# Patient Record
Sex: FEMALE | Race: WHITE | URBAN - METROPOLITAN AREA
[De-identification: names, ages, dates, MRNs, and addresses within clinical notes are randomized per-mention and may not be internally consistent; named-entity substitution may affect disease eponyms.]

---

## 2017-02-07 DIAGNOSIS — F34.1 DYSTHYMIC DISORDER: Primary | ICD-10-CM

## 2017-02-07 NOTE — TELEPHONE ENCOUNTER
venlafaxine     Last Written Prescription Date: 08/12/2016  Last Fill Quantity: 90, # refills: 1  Last Office Visit with [facility] primary care provider:  08/12/2016        Last PHQ-9 score on record=   PHQ-9 SCORE 8/12/2016   Total Score -   Total Score 2

## 2017-02-09 RX ORDER — VENLAFAXINE HYDROCHLORIDE 150 MG/1
150 CAPSULE, EXTENDED RELEASE ORAL DAILY
Qty: 30 CAPSULE | Refills: 0 | Status: SHIPPED | OUTPATIENT
Start: 2017-02-09 | End: 2017-03-15

## 2017-03-15 ENCOUNTER — OFFICE VISIT (OUTPATIENT)
Dept: FAMILY MEDICINE | Facility: CLINIC | Age: 57
End: 2017-03-15
Payer: COMMERCIAL

## 2017-03-15 VITALS
RESPIRATION RATE: 18 BRPM | WEIGHT: 127 LBS | HEIGHT: 70 IN | TEMPERATURE: 97.3 F | BODY MASS INDEX: 18.18 KG/M2 | DIASTOLIC BLOOD PRESSURE: 67 MMHG | SYSTOLIC BLOOD PRESSURE: 116 MMHG | HEART RATE: 60 BPM

## 2017-03-15 DIAGNOSIS — R87.610 PAPANICOLAOU SMEAR OF CERVIX WITH ATYPICAL SQUAMOUS CELLS OF UNDETERMINED SIGNIFICANCE (ASC-US): Primary | ICD-10-CM

## 2017-03-15 DIAGNOSIS — Z92.23: ICD-10-CM

## 2017-03-15 DIAGNOSIS — F34.1 DYSTHYMIC DISORDER: ICD-10-CM

## 2017-03-15 PROCEDURE — 99214 OFFICE O/P EST MOD 30 MIN: CPT | Performed by: FAMILY MEDICINE

## 2017-03-15 RX ORDER — VENLAFAXINE HYDROCHLORIDE 150 MG/1
150 CAPSULE, EXTENDED RELEASE ORAL DAILY
Qty: 90 CAPSULE | Refills: 3 | Status: SHIPPED | OUTPATIENT
Start: 2017-03-15 | End: 2018-03-08

## 2017-03-15 ASSESSMENT — ANXIETY QUESTIONNAIRES
1. FEELING NERVOUS, ANXIOUS, OR ON EDGE: SEVERAL DAYS
GAD7 TOTAL SCORE: 4
2. NOT BEING ABLE TO STOP OR CONTROL WORRYING: SEVERAL DAYS
IF YOU CHECKED OFF ANY PROBLEMS ON THIS QUESTIONNAIRE, HOW DIFFICULT HAVE THESE PROBLEMS MADE IT FOR YOU TO DO YOUR WORK, TAKE CARE OF THINGS AT HOME, OR GET ALONG WITH OTHER PEOPLE: SOMEWHAT DIFFICULT
6. BECOMING EASILY ANNOYED OR IRRITABLE: NOT AT ALL
3. WORRYING TOO MUCH ABOUT DIFFERENT THINGS: SEVERAL DAYS
7. FEELING AFRAID AS IF SOMETHING AWFUL MIGHT HAPPEN: NOT AT ALL
5. BEING SO RESTLESS THAT IT IS HARD TO SIT STILL: NOT AT ALL

## 2017-03-15 ASSESSMENT — PATIENT HEALTH QUESTIONNAIRE - PHQ9: 5. POOR APPETITE OR OVEREATING: SEVERAL DAYS

## 2017-03-15 NOTE — PATIENT INSTRUCTIONS
Thank you for choosing [facility].  You may be receiving a survey in the mail from [facility] regarding your visit today.  Please take a few minutes to complete and return the survey to let us know how we are doing.      Our Clinic hours are:  Mondays    7:20 am - 7 pm  Tues -  Fri  7:20 am - 5 pm    Clinic Phone: [phone]    The clinic lab opens at 7:30 am Mon - Fri and appointments are required.    [facility]. [phone]  Monday-Thursday 8 am - 7pm  Tues/Wed/Fri 8 am - 5:30 pm

## 2017-03-15 NOTE — LETTER
My Depression Action Plan  Name: Bailee Britt   Date of Birth 1960  Date: 3/15/2017    My doctor: EMPERATRIZ Burnette   My clinic: [facility]  [address]  [facility] [address]  [phone]          GREEN    ZONE   Good Control    What it looks like:     Things are going generally well. You have normal up s and down s. You may even feel depressed from time to time, but bad moods usually last less than a day.   What you need to do:  1. Continue to care for yourself (see self care plan)  2. Check your depression survival kit and update it as needed  3. Follow your physician s recommendations including any medication.  4. Do not stop taking medication unless you consult with your physician first.           YELLOW         ZONE Getting Worse    What it looks like:     Depression is starting to interfere with your life.     It may be hard to get out of bed; you may be starting to isolate yourself from others.    Symptoms of depression are starting to last most all day and this has happened for several days.     You may have suicidal thoughts but they are not constant.   What you need to do:     1. Call your care team, your response to treatment will improve if you keep your care team informed of your progress. Yellow periods are signs an adjustment may need to be made.     2. Continue your self-care, even if you have to fake it!    3. Talk to someone in your support network    4. Open up your depression survival kit           RED    ZONE Medical Alert - Get Help    What it looks like:     Depression is seriously interfering with your life.     You may experience these or other symptoms: You can t get out of bed most days, can t work or engage in other necessary activities, you have trouble taking care of basic hygiene, or basic responsibilities, thoughts of suicide or death that will not go away, self-injurious behavior.     What you need to do:  1. Call your care team and  request a same-day appointment. If they are not available (weekends or after hours) call your local crisis line, emergency room or 911.      Electronically signed by: Sabrina Minor, March 15, 2017    Depression Self Care Plan / Survival Kit    Self-Care for Depression  Here s the deal. Your body and mind are really not as separate as most people think.  What you do and think affects how you feel and how you feel influences what you do and think. This means if you do things that people who feel good do, it will help you feel better.  Sometimes this is all it takes.  There is also a place for medication and therapy depending on how severe your depression is, so be sure to consult with your medical provider and/ or Behavioral Health Consultant if your symptoms are worsening or not improving.     In order to better manage my stress, I will:    Exercise  Get some form of exercise, every day. This will help reduce pain and release endorphins, the  feel good  chemicals in your brain. This is almost as good as taking antidepressants!  This is not the same as joining a gym and then never going! (they count on that by the way ) It can be as simple as just going for a walk or doing some gardening, anything that will get you moving.      Hygiene   Maintain good hygiene (Get out of bed in the morning, Make your bed, Brush your teeth, Take a shower, and Get dressed like you were going to work, even if you are unemployed).  If your clothes don't fit try to get ones that do.    Diet  I will strive to eat foods that are good for me, drink plenty of water, and avoid excessive sugar, caffeine, alcohol, and other mood-altering substances.  Some foods that are helpful in depression are: complex carbohydrates, B vitamins, flaxseed, fish or fish oil, fresh fruits and vegetables.    Psychotherapy  I agree to participate in Individual Therapy (if recommended).    Medication  If prescribed medications, I agree to take them.  Missing doses can  result in serious side effects.  I understand that drinking alcohol, or other illicit drug use, may cause potential side effects.  I will not stop my medication abruptly without first discussing it with my provider.    Staying Connected With Others  I will stay in touch with my friends, family members, and my primary care provider/team.    Use your imagination  Be creative.  We all have a creative side; it doesn t matter if it s oil painting, sand castles, or mud pies! This will also kick up the endorphins.    Witness Beauty  (AKA stop and smell the roses) Take a look outside, even in mid-winter. Notice colors, textures. Watch the squirrels and birds.     Service to others  Be of service to others.  There is always someone else in need.  By helping others we can  get out of ourselves  and remember the really important things.  This also provides opportunities for practicing all the other parts of the program.    Humor  Laugh and be silly!  Adjust your TV habits for less news and crime-drama and more comedy.    Control your stress  Try breathing deep, massage therapy, biofeedback, and meditation. Find time to relax each day.     My support system    Clinic Contact:  Phone number:    Contact 1:  Phone number:    Contact 2:  Phone number:    [place]/:  Phone number:    Therapist:  Phone number:    Local crisis center:    Phone number:    Other community support:  Phone number:

## 2017-03-15 NOTE — MR AVS SNAPSHOT
After Visit Summary   3/15/2017    Bailee Britt    MRN: [id number]           Patient Information     Date Of Birth          1960        Visit Information        Provider Department      3/15/2017 2:00 PM Humphrey Ward MD [facility]        Today's Diagnoses     Papanicolaou smear of cervix with atypical squamous cells of undetermined significance (ASC-US)    -  1    Dysthymic disorder        H/O estrogen therapy          Care Instructions          Thank you for choosing [facility].  You may be receiving a survey in the mail from [facility] regarding your visit today.  Please take a few minutes to complete and return the survey to let us know how we are doing.      Our Clinic hours are:  Mondays    7:20 am - 7 pm  Tues -  Fri  7:20 am - 5 pm    Clinic Phone: [phone]    The clinic lab opens at 7:30 am Mon - Fri and appointments are required.    [facility] [place]  Ph. [phone]  Monday-Thursday 8 am - 7pm  Tues/Wed/Fri 8 am - 5:30 pm               Follow-ups after your visit        Who to contact     If you have questions or need follow up information about today's clinic visit or your schedule please contact [facility] directly at [phone].  Normal or non-critical lab and imaging results will be communicated to you by [employer], letter or phone within 4 business days after the clinic has received the results. If you do not hear from us within 7 days, please contact the clinic through [employer] or phone. If you have a critical or abnormal lab result, we will notify you by phone as soon as possible.  Submit refill requests through [employer] or call your pharmacy and they will forward the refill request to us. Please allow 3 business days for your refill to be completed.          Additional Information About Your Visit        [employer] Information     [employer] lets you send messages to your doctor, view your test results, renew your  "prescriptions, schedule appointments and more. To sign up, go to [email or web address] . Click on \"Log in\" on the left side of the screen, which will take you to the Welcome page. Then click on \"Sign up Now\" on the right side of the page.     You will be asked to enter the access code listed below, as well as some personal information. Please follow the directions to create your username and password.     Your access code is: [email or web address]  Expires: 2017  2:28 PM     Your access code will  in 90 days. If you need help or a new code, please call your [facility] clinic or [phone].        Care EveryWhere ID     This is your Care EveryWhere ID. This could be used by other organizations to access your [facility] medical records  [id number]        Your Vitals Were     Pulse Temperature Respirations Height Last Period BMI (Body Mass Index)    60 97.3  F (36.3  C) 18 5' 9.75\" (1.772 m) 2016 (Approximate) 18.35 kg/m2       Blood Pressure from Last 3 Encounters:   03/15/17 116/67   16 120/78   16 126/82    Weight from Last 3 Encounters:   03/15/17 127 lb (57.6 kg)   16 121 lb 12.8 oz (55.2 kg)   16 123 lb (55.8 kg)              We Performed the Following     DEPRESSION ACTION PLAN (DAP)          Today's Medication Changes          These changes are accurate as of: 3/15/17  2:48 PM.  If you have any questions, ask your nurse or doctor.               These medicines have changed or have updated prescriptions.        Dose/Directions    venlafaxine 150 MG 24 hr capsule   Commonly known as:  EFFEXOR-XR   This may have changed:  additional instructions   Used for:  Dysthymic disorder   Changed by:  Humphrey Ward MD        Dose:  150 mg   Take 1 capsule (150 mg) by mouth daily   Quantity:  90 capsule   Refills:  3         Stop taking these medicines if you haven't already. Please contact your care team if you have questions.     medroxyPROGESTERone 2.5 MG tablet   Commonly known as:  " PROVERA   Stopped by:  Humphrey Ward MD                Where to get your medicines      These medications were sent to [facility] [place], [place] - [address]  [address] [address]     Phone:  [phone]     venlafaxine 150 MG 24 hr capsule                Primary Care Provider Office Phone # Fax #    R Rolly Burnette [phone]       [address] [address]        Thank you!     Thank you for choosing [facility]  for your care. Our goal is always to provide you with excellent care. Hearing back from our patients is one way we can continue to improve our services. Please take a few minutes to complete the written survey that you may receive in the mail after your visit with us. Thank you!             Your Updated Medication List - Protect others around you: Learn how to safely use, store and throw away your medicines at www.disposemymeds.org.          This list is accurate as of: 3/15/17  2:48 PM.  Always use your most recent med list.                   Brand Name Dispense Instructions for use    aspirin 81 MG tablet      Take 1 tablet by mouth daily.       atenolol 25 MG tablet    TENORMIN    90 tablet    Take 1 tablet (25 mg) by mouth daily       estradiol 2 MG tablet    ESTRACE    90 tablet    Take 1 tablet (2 mg) by mouth daily       metoprolol 25 MG 24 hr tablet    TOPROL-XL    30 tablet    Take 1 tablet (25 mg) by mouth daily       MULTIVITAMIN TABS   OR      1 TABLET DAILY       rizatriptan 10 MG tablet    MAXALT    30 tablet    Take 1 tablet (10 mg) by mouth See Admin Instructions WITH ONSET OF MIGRAINE, MAY REPEAT ONCE AFTER 2 HOURS. DO NOT EXCEED 3 TABLETS IN 24 HOURS.       venlafaxine 150 MG 24 hr capsule    EFFEXOR-XR    90 capsule    Take 1 capsule (150 mg) by mouth daily

## 2017-03-15 NOTE — PROGRESS NOTES
SUBJECTIVE:                                                    Bailee Britt is a 56 year old female who presents to clinic today for the following health issues:      Depression Followup    Status since last visit: Improved     See PHQ-9 for current symptoms.  Other associated symptoms: None    Complicating factors:   Significant life event:  No   Current substance abuse:  None  Anxiety or Panic symptoms:  No    PHQ-9  English PHQ-9   Any Language            Amount of exercise or physical activity: None    Problems taking medications regularly: No    Medication side effects: none  Diet: regular (no restrictions)        Problem list and histories reviewed & adjusted, as indicated.  Additional history: as documented        Reviewed and updated as needed this visit by clinical staff  Tobacco  Allergies  Meds       Reviewed and updated as needed this visit by Provider      Further history obtained, clarified or corrected by physician:  She is here for review of her medications. She is really not sure why she is taking estrogen and does not recall needing to take it for any specific symptoms at least not in the last several years. She has been on it for at least 6 years and more recently has been on unopposed estrogen having stopped her Provera. In the last year she has been cutting her estrogen tablets in half and has not felt any difference with that dose. She is doing well with her venlafaxine and would like that refilled.    OBJECTIVE:  LUNGS: clear to auscultation, normal breath sounds  CV: RRR without murmur  ABD: BS+, soft, nontender, no masses, no hepatosplenomegaly  EXTREMITIES: without joint tenderness, swelling or erythema.  No muscle tenderness or abnormality.  SKIN: No rashes or abnormalities  NEURO:non focal exam    ASSESSMENT:     Dysthymic disorder  Papanicolaou smear of cervix with atypical squamous cells of undetermined significance (ASC-US)  H/O estrogen therapy    PLAN:  Orders Placed This  Encounter     DEPRESSION ACTION PLAN (DAP)     venlafaxine (EFFEXOR-XR) 150 MG 24 hr capsule     I recommended she stop the estrogen given that it does not seem to be offering her any symptomatic relief and so she will start to taper down and then quit. I told her the other option would be to continue but then we need to get back on progestin which she was not in favor of.

## 2017-03-16 ASSESSMENT — ANXIETY QUESTIONNAIRES: GAD7 TOTAL SCORE: 4

## 2017-03-16 ASSESSMENT — PATIENT HEALTH QUESTIONNAIRE - PHQ9: SUM OF ALL RESPONSES TO PHQ QUESTIONS 1-9: 0

## 2017-05-23 ENCOUNTER — TELEPHONE (OUTPATIENT)
Dept: FAMILY MEDICINE | Facility: CLINIC | Age: 57
End: 2017-05-23

## 2017-05-23 DIAGNOSIS — N95.1 MENOPAUSAL SYNDROME (HOT FLASHES): ICD-10-CM

## 2017-05-23 RX ORDER — ESTRADIOL 0.5 MG/1
0.5 TABLET ORAL DAILY
Qty: 90 TABLET | Refills: 0 | Status: SHIPPED | OUTPATIENT
Start: 2017-05-23 | End: 2017-08-22

## 2017-05-23 NOTE — TELEPHONE ENCOUNTER
Reason for call:  Patient reporting a symptom    Symptom or request: Pt went off of her Estradiol med a while back and now she is having hot flashes and she wants to go back on the med and needs a new Rx - [facility].      Duration (how long have symptoms been present): ongoing    Have you been treated for this before? Yes    Additional comments:     Phone Number patient can be reached at:  Home number on file [phone] (home)    Best Time:  any    Can we leave a detailed message on this number:  YES    Call taken on 5/23/2017 at 9:47 AM by Erica Dupree

## 2017-05-23 NOTE — TELEPHONE ENCOUNTER
I am putting her back on a very low dose which should help control her hot flashes. She needs to take the Prometrium for 10 days every 6 months while on this medication to prevent hyperstimulation of the uterus and uterine cancer. Notify patient

## 2017-05-23 NOTE — TELEPHONE ENCOUNTER
Estradiol refill.  Last seen 3/15/17.  Last written 5/23/16 by Dr. Bunch.  Pt stopped taking med and now requesting refill due to hot flashes.  Advise.  Nery

## 2017-05-24 NOTE — TELEPHONE ENCOUNTER
Confirmed that both Estrogen and Progesterone orders were received @ the Pharmacy.  LM of this with pt.  Yakelin

## 2017-05-24 NOTE — TELEPHONE ENCOUNTER
aravind is calling, stating that she thought she was suppose to get an RX of Estrogen along with the Progesterone. She said the pharmacy at [facility] does not have the Estrogen. She is wondering if she is suppose to be taking that again, as she told DR. Burnette, she is having hot flashes.  Suma Linn  [facility] [place] [facility]

## 2017-06-05 DIAGNOSIS — F41.9 ANXIETY: ICD-10-CM

## 2017-06-05 NOTE — TELEPHONE ENCOUNTER
Metoprolol      Last Written Prescription Date: 11/01/16  Last Fill Quantity: 30,  # refills: 6   Last Office Visit with [facility], [facility] or [facility] prescribing provider: 11/01/16

## 2017-06-06 RX ORDER — METOPROLOL SUCCINATE 25 MG/1
TABLET, EXTENDED RELEASE ORAL
Qty: 90 TABLET | Refills: 2 | Status: SHIPPED | OUTPATIENT
Start: 2017-06-06 | End: 2018-03-08

## 2017-08-22 DIAGNOSIS — N95.1 MENOPAUSAL SYNDROME (HOT FLASHES): ICD-10-CM

## 2017-08-23 NOTE — TELEPHONE ENCOUNTER
estradiol    Last Written Prescription Date: 05/23/2017  Last Fill Quantity: 90, # refills: 0  Last Office Visit with [facility], P or [facility] prescribing provider: 3/15/2017       BP Readings from Last 3 Encounters:   03/15/17 116/67   08/12/16 120/78   04/29/16 126/82     Date of last Breast Exam: 06/18/2015

## 2017-08-31 RX ORDER — ESTRADIOL 0.5 MG/1
TABLET ORAL
Qty: 90 TABLET | Refills: 1 | Status: SHIPPED | OUTPATIENT
Start: 2017-08-31 | End: 2018-03-08

## 2017-08-31 NOTE — TELEPHONE ENCOUNTER
Prescription approved per [facility] Refill Protocol. [email or web address]

## 2017-09-14 ENCOUNTER — DOCUMENTATION ONLY (OUTPATIENT)
Dept: LAB | Facility: CLINIC | Age: 57
End: 2017-09-14

## 2017-09-14 ENCOUNTER — TELEPHONE (OUTPATIENT)
Dept: FAMILY MEDICINE | Facility: CLINIC | Age: 57
End: 2017-09-14

## 2017-09-14 DIAGNOSIS — G43.009 MIGRAINE WITHOUT AURA AND WITHOUT STATUS MIGRAINOSUS, NOT INTRACTABLE: ICD-10-CM

## 2017-09-14 DIAGNOSIS — Z13.220 ENCOUNTER FOR LIPID SCREENING FOR CARDIOVASCULAR DISEASE: ICD-10-CM

## 2017-09-14 DIAGNOSIS — F41.9 ANXIETY: ICD-10-CM

## 2017-09-14 DIAGNOSIS — F34.1 DYSTHYMIC DISORDER: Primary | ICD-10-CM

## 2017-09-14 DIAGNOSIS — Z13.6 ENCOUNTER FOR LIPID SCREENING FOR CARDIOVASCULAR DISEASE: ICD-10-CM

## 2017-09-14 NOTE — TELEPHONE ENCOUNTER
Reason for Call:  Other call back    Detailed comments: patient is calling stating she needs a TB test to be done for school, and it has to be completed by Next Tuesday 9/19. Please call her.    Phone Number Patient can be reached at: Home number on file [phone] (home)    Best Time: any    Can we leave a detailed message on this number? YES    Call taken on 9/14/2017 at 12:10 PM by Suma Linn

## 2017-09-15 ENCOUNTER — TELEPHONE (OUTPATIENT)
Dept: FAMILY MEDICINE | Facility: CLINIC | Age: 57
End: 2017-09-15

## 2017-09-15 ENCOUNTER — ALLIED HEALTH/NURSE VISIT (OUTPATIENT)
Dept: FAMILY MEDICINE | Facility: CLINIC | Age: 57
End: 2017-09-15
Payer: COMMERCIAL

## 2017-09-15 DIAGNOSIS — Z11.1 SCREENING EXAMINATION FOR PULMONARY TUBERCULOSIS: ICD-10-CM

## 2017-09-15 DIAGNOSIS — Z11.1 SCREENING EXAMINATION FOR PULMONARY TUBERCULOSIS: Primary | ICD-10-CM

## 2017-09-15 DIAGNOSIS — Z23 NEED FOR PROPHYLACTIC VACCINATION AND INOCULATION AGAINST INFLUENZA: Primary | ICD-10-CM

## 2017-09-15 DIAGNOSIS — F34.1 DYSTHYMIC DISORDER: ICD-10-CM

## 2017-09-15 DIAGNOSIS — Z13.6 ENCOUNTER FOR LIPID SCREENING FOR CARDIOVASCULAR DISEASE: ICD-10-CM

## 2017-09-15 DIAGNOSIS — Z13.220 ENCOUNTER FOR LIPID SCREENING FOR CARDIOVASCULAR DISEASE: ICD-10-CM

## 2017-09-15 DIAGNOSIS — F41.9 ANXIETY: ICD-10-CM

## 2017-09-15 LAB
BASOPHILS # BLD AUTO: 0 10E9/L (ref 0–0.2)
BASOPHILS NFR BLD AUTO: 0.5 %
DIFFERENTIAL METHOD BLD: NORMAL
EOSINOPHIL # BLD AUTO: 0.1 10E9/L (ref 0–0.7)
EOSINOPHIL NFR BLD AUTO: 1.9 %
ERYTHROCYTE [DISTWIDTH] IN BLOOD BY AUTOMATED COUNT: 11.8 % (ref 10–15)
HCT VFR BLD AUTO: 40.5 % (ref 35–47)
HGB BLD-MCNC: 13.2 G/DL (ref 11.7–15.7)
LYMPHOCYTES # BLD AUTO: 1.9 10E9/L (ref 0.8–5.3)
LYMPHOCYTES NFR BLD AUTO: 30.1 %
MCH RBC QN AUTO: 31.7 PG (ref 26.5–33)
MCHC RBC AUTO-ENTMCNC: 32.6 G/DL (ref 31.5–36.5)
MCV RBC AUTO: 97 FL (ref 78–100)
MONOCYTES # BLD AUTO: 0.5 10E9/L (ref 0–1.3)
MONOCYTES NFR BLD AUTO: 7.2 %
NEUTROPHILS # BLD AUTO: 3.9 10E9/L (ref 1.6–8.3)
NEUTROPHILS NFR BLD AUTO: 60.3 %
PLATELET # BLD AUTO: 182 10E9/L (ref 150–450)
RBC # BLD AUTO: 4.17 10E12/L (ref 3.8–5.2)
WBC # BLD AUTO: 6.4 10E9/L (ref 4–11)

## 2017-09-15 PROCEDURE — 99207 ZZC NO CHARGE NURSE ONLY: CPT

## 2017-09-15 PROCEDURE — 90471 IMMUNIZATION ADMIN: CPT

## 2017-09-15 PROCEDURE — 36415 COLL VENOUS BLD VENIPUNCTURE: CPT | Performed by: FAMILY MEDICINE

## 2017-09-15 PROCEDURE — 80048 BASIC METABOLIC PNL TOTAL CA: CPT | Performed by: FAMILY MEDICINE

## 2017-09-15 PROCEDURE — 90686 IIV4 VACC NO PRSV 0.5 ML IM: CPT

## 2017-09-15 PROCEDURE — 85025 COMPLETE CBC W/AUTO DIFF WBC: CPT | Performed by: FAMILY MEDICINE

## 2017-09-15 PROCEDURE — 84439 ASSAY OF FREE THYROXINE: CPT | Performed by: FAMILY MEDICINE

## 2017-09-15 PROCEDURE — 86480 TB TEST CELL IMMUN MEASURE: CPT | Performed by: FAMILY MEDICINE

## 2017-09-15 PROCEDURE — 80061 LIPID PANEL: CPT | Performed by: FAMILY MEDICINE

## 2017-09-15 PROCEDURE — 84443 ASSAY THYROID STIM HORMONE: CPT | Performed by: FAMILY MEDICINE

## 2017-09-15 NOTE — TELEPHONE ENCOUNTER
Patient has lab appointment TODAY and is requesting a manitoux test.  Need orders ASAP.  Thank you!

## 2017-09-15 NOTE — PROGRESS NOTES
Injectable Influenza Immunization Documentation    1.  Is the person to be vaccinated sick today? NO    2. Does the person to be vaccinated have an allergy to a component   of the vaccine? NO    3. Has the person to be vaccinated ever had a serious reaction   to influenza vaccine in the past? NON     4. Has the person to be vaccinated ever had Guillain-Barré syndrome? NO    Form completed by Soledad Rodriguez CMA

## 2017-09-15 NOTE — LETTER
[facility]  [address] [address]  [phone]       September 19, 2017       Bailee Britt    [address] [place] [address]      Dear Bailee:    acceptable results.                       Sincerely,        Rolly Burnette MD

## 2017-09-15 NOTE — MR AVS SNAPSHOT
"              After Visit Summary   9/15/2017    Bailee Britt    MRN: [id number]           Patient Information     Date Of Birth          1960        Visit Information        Provider Department      9/15/2017 2:00 PM Karla/Srinivasa Díaz [facility]        Today's Diagnoses     Need for prophylactic vaccination and inoculation against influenza    -  1       Follow-ups after your visit        Who to contact     If you have questions or need follow up information about today's clinic visit or your schedule please contact [facility] directly at [phone].  Normal or non-critical lab and imaging results will be communicated to you by [employer], letter or phone within 4 business days after the clinic has received the results. If you do not hear from us within 7 days, please contact the clinic through [employer] or phone. If you have a critical or abnormal lab result, we will notify you by phone as soon as possible.  Submit refill requests through [email or web address] or call your pharmacy and they will forward the refill request to us. Please allow 3 business days for your refill to be completed.          Additional Information About Your Visit        MyChart Information     [email or web address] lets you send messages to your doctor, view your test results, renew your prescriptions, schedule appointments and more. To sign up, go to [email or web address] . Click on \"Log in\" on the left side of the screen, which will take you to the Welcome page. Then click on \"Sign up Now\" on the right side of the page.     You will be asked to enter the access code listed below, as well as some personal information. Please follow the directions to create your username and password.     Your access code is: [email or web address]  Expires: 2017  2:00 PM     Your access code will  in 90 days. If you need help or a new code, please call your [facility] or [phone].        Care EveryWhere ID     This is your Care " EveryWhere ID. This could be used by other organizations to access your [place] medical records  [id number]        Your Vitals Were     Last Period                   02/13/2016 (Approximate)            Blood Pressure from Last 3 Encounters:   03/15/17 116/67   08/12/16 120/78   04/29/16 126/82    Weight from Last 3 Encounters:   03/15/17 127 lb (57.6 kg)   08/12/16 121 lb 12.8 oz (55.2 kg)   04/29/16 123 lb (55.8 kg)              We Performed the Following     FLU VAC, SPLIT VIRUS IM > 3 YO (QUADRIVALENT) [62767]     Vaccine Administration, Initial [61026]        Primary Care Provider Office Phone # Fax #    R Rolly Burnette [phone] [address] [address]        Equal Access to Services     YARA COLE : Hadii aad amanda hadasho Soomaali, waaxda luqadaha, qaybta kaalmada adetrinhyada, valdez collins . So [facility] [phone].    ATENCIÓN: Si habla español, tiene a christiansen disposición servicios gratuitos de asistencia lingüística. Edwardo al [phone].    We comply with applicable federal civil rights laws and [place] laws. We do not discriminate on the basis of race, color, national origin, age, disability sex, sexual orientation or gender identity.            Thank you!     Thank you for choosing [facility]note 2[facility]  for your care. Our goal is always to provide you with excellent care. Hearing back from our patients is one way we can continue to improve our services. Please take a few minutes to complete the written survey that you may receive in the mail after your visit with us. Thank you!             Your Updated Medication List - Protect others around you: Learn how to safely use, store and throw away your medicines at www.disposemymeds.org.          This list is accurate as of: 9/15/17  2:00 PM.  Always use your most recent med list.                   Brand Name Dispense Instructions for use Diagnosis    aspirin 81 MG tablet      Take 1  tablet by mouth daily.        atenolol 25 MG tablet    TENORMIN    90 tablet    Take 1 tablet (25 mg) by mouth daily    Migraine without aura and without status migrainosus, not intractable       estradiol 0.5 MG tablet    ESTRACE    90 tablet    TAKE ONE TABLET BY MOUTH ONCE DAILY    Menopausal syndrome (hot flashes)       metoprolol 25 MG 24 hr tablet    TOPROL-XL    90 tablet    TAKE ONE TABLET BY MOUTH ONCE DAILY    Anxiety       MULTIVITAMIN TABS   OR      1 TABLET DAILY        rizatriptan 10 MG tablet    MAXALT    30 tablet    Take 1 tablet (10 mg) by mouth See Admin Instructions WITH ONSET OF MIGRAINE, MAY REPEAT ONCE AFTER 2 HOURS. DO NOT EXCEED 3 TABLETS IN 24 HOURS.    Migraine without aura and without status migrainosus, not intractable       venlafaxine 150 MG 24 hr capsule    EFFEXOR-XR    90 capsule    Take 1 capsule (150 mg) by mouth daily    Dysthymic disorder

## 2017-09-16 LAB
ANION GAP SERPL CALCULATED.3IONS-SCNC: 5 MMOL/L (ref 3–14)
BUN SERPL-MCNC: 12 MG/DL (ref 7–30)
CALCIUM SERPL-MCNC: 9.1 MG/DL (ref 8.5–10.1)
CHLORIDE SERPL-SCNC: 101 MMOL/L (ref 94–109)
CHOLEST SERPL-MCNC: 202 MG/DL
CO2 SERPL-SCNC: 30 MMOL/L (ref 20–32)
CREAT SERPL-MCNC: 0.71 MG/DL (ref 0.52–1.04)
GFR SERPL CREATININE-BSD FRML MDRD: 85 ML/MIN/1.7M2
GLUCOSE SERPL-MCNC: 92 MG/DL (ref 70–99)
HDLC SERPL-MCNC: 82 MG/DL
LDLC SERPL CALC-MCNC: 97 MG/DL
NONHDLC SERPL-MCNC: 120 MG/DL
POTASSIUM SERPL-SCNC: 3.6 MMOL/L (ref 3.4–5.3)
SODIUM SERPL-SCNC: 136 MMOL/L (ref 133–144)
T4 FREE SERPL-MCNC: 0.88 NG/DL (ref 0.76–1.46)
TRIGL SERPL-MCNC: 113 MG/DL
TSH SERPL DL<=0.005 MIU/L-ACNC: 0.36 MU/L (ref 0.4–4)

## 2017-09-19 LAB
M TB TUBERC IFN-G BLD QL: NEGATIVE
M TB TUBERC IFN-G/MITOGEN IGNF BLD: 0 IU/ML

## 2017-10-31 DIAGNOSIS — G43.009 MIGRAINE WITHOUT AURA AND WITHOUT STATUS MIGRAINOSUS, NOT INTRACTABLE: ICD-10-CM

## 2017-10-31 RX ORDER — RIZATRIPTAN BENZOATE 10 MG/1
10 TABLET ORAL SEE ADMIN INSTRUCTIONS
Qty: 30 TABLET | Refills: 1 | Status: SHIPPED | OUTPATIENT
Start: 2017-10-31 | End: 2018-03-08

## 2017-10-31 NOTE — TELEPHONE ENCOUNTER
Rizatriptan      Last Written Prescription Date: 0/12/2016  Last Fill Quantity: 30, # refills: 11  Last Office Visit with [facility], [facility] or [facility] prescribing provider: 03/15/2017       BP Readings from Last 3 Encounters:   03/15/17 116/67   08/12/16 120/78   04/29/16 126/82     Domenico VALLADARES (R)

## 2017-12-28 ENCOUNTER — TELEPHONE (OUTPATIENT)
Dept: FAMILY MEDICINE | Facility: CLINIC | Age: 57
End: 2017-12-28

## 2017-12-28 NOTE — TELEPHONE ENCOUNTER
Panel Management Review      Patient has the following on her problem list: None      Composite cancer screening  Chart review shows that this patient is due/due soon for the following Mammogram  Summary:    Patient is due/failing the following:   MAMMOGRAM and PHQ9    Action needed:   Patient needs to do PHQ9. and Patient needs referral/order: mammogram    Type of outreach:    Phone, left message for patient to call back.     Questions for provider review:    None                                                                                                                                    Mariangel Gautam MA       Chart routed to Care Team .

## 2018-03-08 ENCOUNTER — OFFICE VISIT (OUTPATIENT)
Dept: FAMILY MEDICINE | Facility: CLINIC | Age: 58
End: 2018-03-08
Payer: MEDICAID

## 2018-03-08 VITALS
BODY MASS INDEX: 18.5 KG/M2 | RESPIRATION RATE: 12 BRPM | HEART RATE: 67 BPM | HEIGHT: 70 IN | OXYGEN SATURATION: 99 % | DIASTOLIC BLOOD PRESSURE: 79 MMHG | WEIGHT: 129.2 LBS | TEMPERATURE: 98.2 F | SYSTOLIC BLOOD PRESSURE: 119 MMHG

## 2018-03-08 DIAGNOSIS — F41.9 ANXIETY: ICD-10-CM

## 2018-03-08 DIAGNOSIS — Z12.31 ENCOUNTER FOR SCREENING MAMMOGRAM FOR BREAST CANCER: Primary | ICD-10-CM

## 2018-03-08 DIAGNOSIS — Z80.3 FAMILY HISTORY OF MALIGNANT NEOPLASM OF BREAST IN FIRST DEGREE RELATIVE: ICD-10-CM

## 2018-03-08 DIAGNOSIS — G43.009 MIGRAINE WITHOUT AURA AND WITHOUT STATUS MIGRAINOSUS, NOT INTRACTABLE: ICD-10-CM

## 2018-03-08 DIAGNOSIS — F34.1 DYSTHYMIC DISORDER: ICD-10-CM

## 2018-03-08 PROCEDURE — 99214 OFFICE O/P EST MOD 30 MIN: CPT | Performed by: NURSE PRACTITIONER

## 2018-03-08 RX ORDER — VENLAFAXINE HYDROCHLORIDE 150 MG/1
150 CAPSULE, EXTENDED RELEASE ORAL DAILY
Qty: 90 CAPSULE | Refills: 3 | Status: SHIPPED | OUTPATIENT
Start: 2018-03-08 | End: 2019-04-11

## 2018-03-08 RX ORDER — RIZATRIPTAN BENZOATE 10 MG/1
10 TABLET ORAL SEE ADMIN INSTRUCTIONS
Qty: 30 TABLET | Refills: 1 | Status: SHIPPED | OUTPATIENT
Start: 2018-03-08 | End: 2019-03-25

## 2018-03-08 RX ORDER — METOPROLOL SUCCINATE 25 MG/1
25 TABLET, EXTENDED RELEASE ORAL DAILY
Qty: 90 TABLET | Refills: 3 | Status: SHIPPED | OUTPATIENT
Start: 2018-03-08 | End: 2019-04-11

## 2018-03-08 ASSESSMENT — ANXIETY QUESTIONNAIRES
2. NOT BEING ABLE TO STOP OR CONTROL WORRYING: NOT AT ALL
6. BECOMING EASILY ANNOYED OR IRRITABLE: NOT AT ALL
5. BEING SO RESTLESS THAT IT IS HARD TO SIT STILL: NOT AT ALL
3. WORRYING TOO MUCH ABOUT DIFFERENT THINGS: NOT AT ALL
1. FEELING NERVOUS, ANXIOUS, OR ON EDGE: NOT AT ALL
GAD7 TOTAL SCORE: 0
7. FEELING AFRAID AS IF SOMETHING AWFUL MIGHT HAPPEN: NOT AT ALL
IF YOU CHECKED OFF ANY PROBLEMS ON THIS QUESTIONNAIRE, HOW DIFFICULT HAVE THESE PROBLEMS MADE IT FOR YOU TO DO YOUR WORK, TAKE CARE OF THINGS AT HOME, OR GET ALONG WITH OTHER PEOPLE: NOT DIFFICULT AT ALL

## 2018-03-08 ASSESSMENT — PATIENT HEALTH QUESTIONNAIRE - PHQ9: 5. POOR APPETITE OR OVEREATING: NOT AT ALL

## 2018-03-08 NOTE — NURSING NOTE
"Chief Complaint   Patient presents with     Hypertension     refill metropolol     Headache     refill maxalt      Depression     refill effexor       Initial /79 (BP Location: Right arm, Patient Position: Sitting, Cuff Size: Adult Regular)  Pulse 67  Temp 98.2  F (36.8  C) (Tympanic)  Resp 12  Ht 5' 9.75\" (1.772 m)  Wt 129 lb 3.2 oz (58.6 kg)  LMP 02/13/2016 (Approximate)  SpO2 99%  BMI 18.67 kg/m2 Estimated body mass index is 18.67 kg/(m^2) as calculated from the following:    Height as of this encounter: 5' 9.75\" (1.772 m).    Weight as of this encounter: 129 lb 3.2 oz (58.6 kg).  Medication Reconciliation: complete  "

## 2018-03-08 NOTE — LETTER
My Migraine Action Plan      Date: 3/8/2018     My Name: Bailee Britt   YOB: 1960  My Pharmacy:    [id number] - [address] [facility] - [place], [place] - [address] [address]       My (Preventative) Control Medicine: n.a        My Rescue Medicine: Maxalt   My Doctor: EMPERATRIZ Burnette     My Clinic: [address] [address]  [phone]        GREEN ZONE = Good Control    My headache plan is working.   I can do what I need to do.           I WILL:     ? Keep managing my triggers.  ? Write in my migraine diary each time I have a headache.  ? Keep taking my preventive (controller) medicine daily.  ? Take my relief and rescue medicine as needed.             YELLOW ZONE = Not Enough Control    My headache plan isn t always working.   My headaches keep me from doing   some of the things I need to do.       I WILL:     ? Set goals to control my triggers and act on them.  ? Write in my migraine diary each time I have a headache and review it for                      patterns or new triggers.  ? Keep taking my preventive (controller) medicine daily.  ? Take my relief and rescue medicine as needed.  ? Call my doctor or clinic at if I stay in the Yellow Zone.             RED ZONE = Poor or No Control    My headache plan has  failed. I can t do anything  when I have one. My  medicines aren t working.           I WILL:   ? Set goals to control my triggers and act on them.  ? Write in my migraine diary each time I have a headache and review it for                      patterns or new triggers.  ? Keep taking my preventive (controller) medicine daily.  ? Take my relief and rescue medicine as needed.  ? Call my doctor or clinic or go to urgent care or an ER if I m having the worst                  headache of my life.  ? Call my doctor or clinic or go to urgent care or an ER if my medicine doesn t work.  ? Let  my doctor or clinic know within 2 weeks if I have gone to an urgent care or             emergency department.          Provider specific instructions:  n/a

## 2018-03-08 NOTE — PATIENT INSTRUCTIONS
[facility]- [phone]  2nd/4th Monday afternoon, every other Wednesday afternoon  Understanding Anxiety Disorders  Almost everyone gets nervous now and then. It s normal to have knots in your stomach before a test, or for your heart to race on a first date. But an anxiety disorder is much more than a case of nerves. In fact, its symptoms may be overwhelming. But treatment can relieve many of these symptoms. Talking to your healthcare provider is the first step.    What are anxiety disorders?  An anxiety disorder causes intense feelings of panic and fear. These feelings may arise for no apparent reason. And they tend to recur again and again. They may prevent you from coping with life and cause you great distress. As a result, you may avoid anything that triggers your fear. In extreme cases, you may never leave the house. Anxiety disorders may cause other symptoms, such as:    Obsessive thoughts you can t control    Constant nightmares or painful thoughts of the past    Nausea, sweating, and muscle tension    Trouble sleeping or concentrating  What causes anxiety disorders?  Anxiety disorders tend to run in families. For some people, childhood abuse or neglect may play a role. For others, stressful life events or trauma may trigger anxiety disorders. Anxiety can trigger low self-esteem and poor coping skills.  Common anxiety disorders    Panic disorder. This causes an intense fear of being in danger.    Phobias. These are extreme fears of certain objects, places, or events.    Obsessive-compulsive disorder. This causes you to have unwanted thoughts and urges. You also may perform certain actions over and over.    Posttraumatic stress disorder. This occurs in people who have survived a terrible ordeal. It can cause nightmares and flashbacks about the event.    Generalized anxiety disorder. This causes constant worry that can greatly disrupt your life.   Getting better  You may believe that nothing can help you. Or,  you might fear what others may think. But most anxiety symptoms can be eased. Having an anxiety disorder is nothing to be ashamed of. Most people do best with treatment that combines medicine and therapy. These aren t cures. But they can help you live a healthier life.  Date Last Reviewed: 2/1/2017 2000-2017 [employer]. [address] [place] [address], [place] [address]. All rights reserved. This information is not intended as a substitute for professional medical care. Always follow your healthcare professional's instructions.        Preventing Migraine Headaches: Triggers     Red wine is a common migraine trigger.     The first step in preventing migraines is to learn what triggers them. You may then be able to control your triggers to avoid or reduce the severity of your migraines.  Know your triggers  Be aware that you may have more than one trigger, and that some triggers may work together. Common migraine triggers include:    Food and nutrition. Skipping meals or not drinking enough water can trigger headaches. So can certain foods, such as caffeine, monosodium glutamate (MSG), aged cheese, or sausage.    Alcohol. Red wine and other alcoholic beverages are common migraine triggers.    Chemicals. Scents, cleaning products, gasoline, glue, perfume, and paint can be triggers. So can tobacco smoke, including secondhand smoke.    Emotions. Stress can trigger headaches or make them worse once they begin.    Sleep disruption. Staying up late, sleeping late, and traveling across time zones can disrupt your sleep cycle, triggering headaches.    Hormones. Many women notice that migraines tend to happen at a certain point in their menstrual cycle. Birth control pills or hormone replacement therapy may also trigger migraines.    Environment and weather. Air travel, changes in altitude, air pressure changes, hot sun, or bright or flashing lights can be triggers.  Control your triggers  These are some of the things  you can do to try to control triggers:    Avoid triggers if you can. For example, stay clear of alcohol and foods that trigger your headaches. Use unscented household products. Keep regular sleep habits. Manage stress to help control emotional triggers.    Change your behavior at times when triggers can't be avoided. For example, make sure to get enough rest and drink plenty of water while you're traveling. Make sure to carry a hat, sunglasses, and your medicines. Be alert for migraine symptoms, so you can treat a migraine early if it happens.  Date Last Reviewed: 10/9/2015    5491-8711 [employer]. [address] [place] [address], [place] [address]. All rights reserved. This information is not intended as a substitute for professional medical care. Always follow your healthcare professional's instructions.

## 2018-03-08 NOTE — MR AVS SNAPSHOT
After Visit Summary   3/8/2018    Bailee Britt    MRN: [id number]           Patient Information     Date Of Birth          1960        Visit Information        Provider Department      3/8/2018 10:40 AM Sabrina Gomez APRN CNP [facility]        Today's Diagnoses     Encounter for screening mammogram for breast cancer    -  1    Anxiety        Dysthymic disorder        Migraine without aura and without status migrainosus, not intractable          Care Instructions    [facility]- [phone]  2nd/4th Monday afternoon, every other Wednesday afternoon  Understanding Anxiety Disorders  Almost everyone gets nervous now and then. It s normal to have knots in your stomach before a test, or for your heart to race on a first date. But an anxiety disorder is much more than a case of nerves. In fact, its symptoms may be overwhelming. But treatment can relieve many of these symptoms. Talking to your healthcare provider is the first step.    What are anxiety disorders?  An anxiety disorder causes intense feelings of panic and fear. These feelings may arise for no apparent reason. And they tend to recur again and again. They may prevent you from coping with life and cause you great distress. As a result, you may avoid anything that triggers your fear. In extreme cases, you may never leave the house. Anxiety disorders may cause other symptoms, such as:    Obsessive thoughts you can t control    Constant nightmares or painful thoughts of the past    Nausea, sweating, and muscle tension    Trouble sleeping or concentrating  What causes anxiety disorders?  Anxiety disorders tend to run in families. For some people, childhood abuse or neglect may play a role. For others, stressful life events or trauma may trigger anxiety disorders. Anxiety can trigger low self-esteem and poor coping skills.  Common anxiety disorders    Panic disorder. This causes an intense fear of being in  danger.    Phobias. These are extreme fears of certain objects, places, or events.    Obsessive-compulsive disorder. This causes you to have unwanted thoughts and urges. You also may perform certain actions over and over.    Posttraumatic stress disorder. This occurs in people who have survived a terrible ordeal. It can cause nightmares and flashbacks about the event.    Generalized anxiety disorder. This causes constant worry that can greatly disrupt your life.   Getting better  You may believe that nothing can help you. Or, you might fear what others may think. But most anxiety symptoms can be eased. Having an anxiety disorder is nothing to be ashamed of. Most people do best with treatment that combines medicine and therapy. These aren t cures. But they can help you live a healthier life.  Date Last Reviewed: 2/1/2017 2000-2017 [employer]. [address] [place] [address]. All rights reserved. This information is not intended as a substitute for professional medical care. Always follow your healthcare professional's instructions.        Preventing Migraine Headaches: Triggers     Red wine is a common migraine trigger.     The first step in preventing migraines is to learn what triggers them. You may then be able to control your triggers to avoid or reduce the severity of your migraines.  Know your triggers  Be aware that you may have more than one trigger, and that some triggers may work together. Common migraine triggers include:    Food and nutrition. Skipping meals or not drinking enough water can trigger headaches. So can certain foods, such as caffeine, monosodium glutamate (MSG), aged cheese, or sausage.    Alcohol. Red wine and other alcoholic beverages are common migraine triggers.    Chemicals. Scents, cleaning products, gasoline, glue, perfume, and paint can be triggers. So can tobacco smoke, including secondhand smoke.    Emotions. Stress can trigger headaches or make them  worse once they begin.    Sleep disruption. Staying up late, sleeping late, and traveling across time zones can disrupt your sleep cycle, triggering headaches.    Hormones. Many women notice that migraines tend to happen at a certain point in their menstrual cycle. Birth control pills or hormone replacement therapy may also trigger migraines.    Environment and weather. Air travel, changes in altitude, air pressure changes, hot sun, or bright or flashing lights can be triggers.  Control your triggers  These are some of the things you can do to try to control triggers:    Avoid triggers if you can. For example, stay clear of alcohol and foods that trigger your headaches. Use unscented household products. Keep regular sleep habits. Manage stress to help control emotional triggers.    Change your behavior at times when triggers can't be avoided. For example, make sure to get enough rest and drink plenty of water while you're traveling. Make sure to carry a hat, sunglasses, and your medicines. Be alert for migraine symptoms, so you can treat a migraine early if it happens.  Date Last Reviewed: 10/9/2015    6647-8294 [employer]. [address] [place] [address]. All rights reserved. This information is not intended as a substitute for professional medical care. Always follow your healthcare professional's instructions.                Follow-ups after your visit        Future tests that were ordered for you today     Open Future Orders        Priority Expected Expires Ordered    *MA Screening Digital Bilateral Routine  3/8/2019 3/8/2018            Who to contact     If you have questions or need follow up information about today's clinic visit or your schedule please contact [facility] directly at [phone].  Normal or non-critical lab and imaging results will be communicated to you by MyChart, letter or phone within 4 business days after the clinic has received the results.  "If you do not hear from us within 7 days, please contact the clinic through [employer] or phone. If you have a critical or abnormal lab result, we will notify you by phone as soon as possible.  Submit refill requests through [employer] or call your pharmacy and they will forward the refill request to us. Please allow 3 business days for your refill to be completed.          Additional Information About Your Visit        [employer] Information     [employer] lets you send messages to your doctor, view your test results, renew your prescriptions, schedule appointments and more. To sign up, go to [email or web address] . Click on \"Log in\" on the left side of the screen, which will take you to the Welcome page. Then click on \"Sign up Now\" on the right side of the page.     You will be asked to enter the access code listed below, as well as some personal information. Please follow the directions to create your username and password.     Your access code is: [email or web address]  Expires: 2018 11:11 AM     Your access code will  in 90 days. If you need help or a new code, please call your [facility] clinic or [phone].        Care EveryWhere ID     This is your Care EveryWhere ID. This could be used by other organizations to access your [facility] medical records  [id number]        Your Vitals Were     Pulse Temperature Respirations Height Last Period Pulse Oximetry    67 98.2  F (36.8  C) (Tympanic) 12 5' 9.75\" (1.772 m) 2016 (Approximate) 99%    BMI (Body Mass Index)                   18.67 kg/m2            Blood Pressure from Last 3 Encounters:   18 119/79   03/15/17 116/67   16 120/78    Weight from Last 3 Encounters:   18 129 lb 3.2 oz (58.6 kg)   03/15/17 127 lb (57.6 kg)   16 121 lb 12.8 oz (55.2 kg)                 Today's Medication Changes          These changes are accurate as of 3/8/18 11:11 AM.  If you have any questions, ask your nurse or doctor.               These medicines have " changed or have updated prescriptions.        Dose/Directions    metoprolol succinate 25 MG 24 hr tablet   Commonly known as:  TOPROL-XL   This may have changed:  See the new instructions.   Used for:  Anxiety   Changed by:  Sabrina Gomez APRN CNP        Dose:  25 mg   Take 1 tablet (25 mg) by mouth daily   Quantity:  90 tablet   Refills:  3         Stop taking these medicines if you haven't already. Please contact your care team if you have questions.     atenolol 25 MG tablet   Commonly known as:  TENORMIN   Stopped by:  Sabrina Gomez APRN CNP                Where to get your medicines      These medications were sent to [facility] [address] [place] [address] [facility]  [address] [place] [address]     Phone:  [phone]     metoprolol succinate 25 MG 24 hr tablet    venlafaxine 150 MG 24 hr capsule         Some of these will need a paper prescription and others can be bought over the counter.  Ask your nurse if you have questions.     Bring a paper prescription for each of these medications     rizatriptan 10 MG tablet                Primary Care Provider Office Phone # Fax #    R Rolly Burnette [phone] [address] [facility] [address]        Equal Access to Services     YARA COLE AH: Hadii melyssa patel hadasho Soomaali, waaxda luqadaha, qaybta kaalmada adeegyada, valdez poole. So [facility] [phone].    ATENCIÓN: Si habla español, tiene a christiansen disposición servicios gratuitos de asistencia lingüística. Llame al [phone].    We comply with applicable federal civil rights laws and [place] laws. We do not discriminate on the basis of race, color, national origin, age, disability, sex, sexual orientation, or gender identity.            Thank you!     Thank you for choosing [facility]  for your care. Our goal is always to provide you with excellent care. Hearing back from our patients is one  way we can continue to improve our services. Please take a few minutes to complete the written survey that you may receive in the mail after your visit with us. Thank you!             Your Updated Medication List - Protect others around you: Learn how to safely use, store and throw away your medicines at www.disposemymeds.org.          This list is accurate as of 3/8/18 11:11 AM.  Always use your most recent med list.                   Brand Name Dispense Instructions for use Diagnosis    aspirin 81 MG tablet      Take 1 tablet by mouth daily.        metoprolol succinate 25 MG 24 hr tablet    TOPROL-XL    90 tablet    Take 1 tablet (25 mg) by mouth daily    Anxiety       MULTIVITAMIN TABS   OR      1 TABLET DAILY        rizatriptan 10 MG tablet    MAXALT    30 tablet    Take 1 tablet (10 mg) by mouth See Admin Instructions WITH ONSET OF MIGRAINE, MAY REPEAT ONCE AFTER 2 HOURS. DO NOT EXCEED 3 TABLETS IN 24 HOURS.    Migraine without aura and without status migrainosus, not intractable       venlafaxine 150 MG 24 hr capsule    EFFEXOR-XR    90 capsule    Take 1 capsule (150 mg) by mouth daily    Dysthymic disorder

## 2018-03-08 NOTE — PROGRESS NOTES
SUBJECTIVE:   Bailee Britt is a 57 year old female who presents to clinic today for the following health issues:          Medication Followup of metropolol    Taking Medication as prescribed: yes    Side Effects:  None    Medication Helping Symptoms:  Yes- uses for tachycardia associated with anxiety      Depression and Anxiety Follow-Up    Status since last visit: Stable to improved-no issue    Other associated symptoms:None    Complicating factors:     Significant life event: No     Current substance abuse: None    PHQ-9 8/12/2016 3/15/2017 3/8/2018   Total Score 2 0 0   Q9: Suicide Ideation Not at all Not at all Not at all     DORY-7 SCORE 8/12/2016 3/15/2017 3/8/2018   Total Score - - -   Total Score 11 4 0     In the past two weeks have you had thoughts of suicide or self-harm?  No.    Do you have concerns about your personal safety or the safety of others?   No  PHQ-9  English  PHQ-9   Any Language  DORY-7  Suicide Assessment Five-step Evaluation and Treatment (SAFE-T)      Amount of exercise or physical activity: 6-7 days/week for an average of 15-30 minutes    Problems taking medications regularly: No    Medication side effects: none     Diet: regular (no restrictions) besides no added salt    Medication Followup of venlafaxine    Taking Medication as prescribed: yes    Side Effects:  None    Medication Helping Symptoms:  yes     Medication Followup of Maxalt    Taking Medication as prescribed: yes    Side Effects:  None    Medication Helping Symptoms:  Yes-hasn't had any recent migraines-she believes that they are related to weather changes and would like to have some on hand with spring coming.        Migraine Follow-Up    Headaches symptoms:  Stable - seasonal, spring and fall.    Frequency: twice a year     Duration of headaches: 1 day    Able to do normal daily activities/work with migraines: No - not unless she takes the Maxalt    Rescue/Relief medication:Maxalt              Effectiveness: total  "relief    Preventative medication: None    Neurologic complications: No new stroke-like symptoms, loss of vision or speech, numbness or weakness    In the past 4 weeks, how often have you gone to Urgent Care or the emergency room because of your headaches?  0      Problem list and histories reviewed & adjusted, as indicated.  Additional history: as documented    Patient Active Problem List   Diagnosis     Dysthymic disorder     CARDIOVASCULAR SCREENING; LDL GOAL LESS THAN 160     Advanced directives, counseling/discussion     ASCUS on Pap smear     Migraine without aura and without status migrainosus, not intractable     Anxiety     Past Surgical History:   Procedure Laterality Date     OPEN REDUCTION INTERNAL FIXATION TOE(S)  10/21/2011    Procedure:OPEN REDUCTION INTERNAL FIXATION TOE(S); Open Reduction Internal Fixation 5th Metatarsal Left Foot; Surgeon:PAGE DALAL; Location:[facility]     SURGICAL HISTORY OF -   1992    CYST RIGHT NECK LOCAL     TONSILLECTOMY         Social History   Substance Use Topics     Smoking status: Never Smoker     Smokeless tobacco: Never Used     Alcohol use Yes      Comment: occas     Family History   Problem Relation Age of Onset     Hypertension Mother      CANCER Mother      Hypertension Father      CANCER Brother      HEART DISEASE Brother      Hypertension Brother      Family History Negative No family hx of            Reviewed and updated as needed this visit by clinical staff  Tobacco  Allergies  Meds  Med Hx  Surg Hx  Fam Hx  Soc Hx      Reviewed and updated as needed this visit by Provider         ROS:  Constitutional, HEENT, cardiovascular, pulmonary, gi and gu systems are negative, except as otherwise noted.    OBJECTIVE:     /79 (BP Location: Right arm, Patient Position: Sitting, Cuff Size: Adult Regular)  Pulse 67  Temp 98.2  F (36.8  C) (Tympanic)  Resp 12  Ht 5' 9.75\" (1.772 m)  Wt 129 lb 3.2 oz (58.6 kg)  LMP 02/13/2016 (Approximate)  SpO2 99%  " BMI 18.67 kg/m2  Body mass index is 18.67 kg/(m^2).  GENERAL: healthy, alert and no distress  NECK: no adenopathy, no asymmetry, masses, or scars and thyroid normal to palpation  RESP: lungs clear to auscultation - no rales, rhonchi or wheezes  CV: regular rates and rhythm, normal S1 S2, no S3 or S4 and no murmur, click or rub  SKIN: no suspicious lesions or rashes  NEURO: Normal strength and tone, mentation intact and speech normal  PSYCH: mentation appears normal, affect normal/bright    Diagnostic Test Results:  none     ASSESSMENT/PLAN:     Migraine: controlled   Plan:  No changes in the patient's current treatment plan  See patient instructions for Migraine Action Plan      ICD-10-CM    1. Encounter for screening mammogram for breast cancer Z12.31 *MA Screening Digital Bilateral   2. Anxiety F41.9 metoprolol succinate (TOPROL-XL) 25 MG 24 hr tablet   3. Dysthymic disorder F34.1 venlafaxine (EFFEXOR-XR) 150 MG 24 hr capsule    pt going thru a divorce currently.  family and Muslim have been supportive.   4. Migraine without aura and without status migrainosus, not intractable G43.009 rizatriptan (MAXALT) 10 MG tablet       FURTHER TESTING:       - mammogram    FUTURE APPOINTMENTS:       - Follow-up for annual visit or as needed    Patient Instructions     [facility]- [phone]  2nd/4th Monday afternoon, every other Wednesday afternoon  Understanding Anxiety Disorders  Almost everyone gets nervous now and then. It s normal to have knots in your stomach before a test, or for your heart to race on a first date. But an anxiety disorder is much more than a case of nerves. In fact, its symptoms may be overwhelming. But treatment can relieve many of these symptoms. Talking to your healthcare provider is the first step.    What are anxiety disorders?  An anxiety disorder causes intense feelings of panic and fear. These feelings may arise for no apparent reason. And they tend to recur again and again. They may prevent you  from coping with life and cause you great distress. As a result, you may avoid anything that triggers your fear. In extreme cases, you may never leave the house. Anxiety disorders may cause other symptoms, such as:    Obsessive thoughts you can t control    Constant nightmares or painful thoughts of the past    Nausea, sweating, and muscle tension    Trouble sleeping or concentrating  What causes anxiety disorders?  Anxiety disorders tend to run in families. For some people, childhood abuse or neglect may play a role. For others, stressful life events or trauma may trigger anxiety disorders. Anxiety can trigger low self-esteem and poor coping skills.  Common anxiety disorders    Panic disorder. This causes an intense fear of being in danger.    Phobias. These are extreme fears of certain objects, places, or events.    Obsessive-compulsive disorder. This causes you to have unwanted thoughts and urges. You also may perform certain actions over and over.    Posttraumatic stress disorder. This occurs in people who have survived a terrible ordeal. It can cause nightmares and flashbacks about the event.    Generalized anxiety disorder. This causes constant worry that can greatly disrupt your life.   Getting better  You may believe that nothing can help you. Or, you might fear what others may think. But most anxiety symptoms can be eased. Having an anxiety disorder is nothing to be ashamed of. Most people do best with treatment that combines medicine and therapy. These aren t cures. But they can help you live a healthier life.  Date Last Reviewed: 2/1/2017 2000-2017 [employer]. [address] [place] [address], [place] [address]. All rights reserved. This information is not intended as a substitute for professional medical care. Always follow your healthcare professional's instructions.        Preventing Migraine Headaches: Triggers     Red wine is a common migraine trigger.     The first step in preventing  migraines is to learn what triggers them. You may then be able to control your triggers to avoid or reduce the severity of your migraines.  Know your triggers  Be aware that you may have more than one trigger, and that some triggers may work together. Common migraine triggers include:    Food and nutrition. Skipping meals or not drinking enough water can trigger headaches. So can certain foods, such as caffeine, monosodium glutamate (MSG), aged cheese, or sausage.    Alcohol. Red wine and other alcoholic beverages are common migraine triggers.    Chemicals. Scents, cleaning products, gasoline, glue, perfume, and paint can be triggers. So can tobacco smoke, including secondhand smoke.    Emotions. Stress can trigger headaches or make them worse once they begin.    Sleep disruption. Staying up late, sleeping late, and traveling across time zones can disrupt your sleep cycle, triggering headaches.    Hormones. Many women notice that migraines tend to happen at a certain point in their menstrual cycle. Birth control pills or hormone replacement therapy may also trigger migraines.    Environment and weather. Air travel, changes in altitude, air pressure changes, hot sun, or bright or flashing lights can be triggers.  Control your triggers  These are some of the things you can do to try to control triggers:    Avoid triggers if you can. For example, stay clear of alcohol and foods that trigger your headaches. Use unscented household products. Keep regular sleep habits. Manage stress to help control emotional triggers.    Change your behavior at times when triggers can't be avoided. For example, make sure to get enough rest and drink plenty of water while you're traveling. Make sure to carry a hat, sunglasses, and your medicines. Be alert for migraine symptoms, so you can treat a migraine early if it happens.  Date Last Reviewed: 10/9/2015    9788-2708 [employer]. [address] [place] [address], [place] [address].  All rights reserved. This information is not intended as a substitute for professional medical care. Always follow your healthcare professional's instructions.            GIN Diggs [facility]

## 2018-03-09 ASSESSMENT — ANXIETY QUESTIONNAIRES: GAD7 TOTAL SCORE: 0

## 2018-03-09 ASSESSMENT — PATIENT HEALTH QUESTIONNAIRE - PHQ9: SUM OF ALL RESPONSES TO PHQ QUESTIONS 1-9: 0

## 2018-03-10 ENCOUNTER — HEALTH MAINTENANCE LETTER (OUTPATIENT)
Age: 58
End: 2018-03-10

## 2018-04-05 ENCOUNTER — HOSPITAL ENCOUNTER (OUTPATIENT)
Dept: MAMMOGRAPHY | Facility: CLINIC | Age: 58
Discharge: HOME | End: 2018-04-05
Attending: NURSE PRACTITIONER | Admitting: NURSE PRACTITIONER
Payer: COMMERCIAL

## 2018-04-05 DIAGNOSIS — Z12.31 ENCOUNTER FOR SCREENING MAMMOGRAM FOR BREAST CANCER: ICD-10-CM

## 2018-04-05 PROCEDURE — 77067 SCR MAMMO BI INCL CAD: CPT

## 2018-04-05 NOTE — LETTER
[facility]   [address]  [facility] [address]  Phone: [phone]  Fax: [phone]    04/06/18    Bailee Britt  [address] [place] [address]    Roby Morocho,    Enclosed is your mammogram report. Everything is normal. Repeat in 1 year. If you have any questions please call the number listed above.    Thanks,      Sabrina Gomez, APRN CNP/rkm

## 2018-04-06 NOTE — PROGRESS NOTES
Please send patient letter notifying of labs and provider message.    Normal mammogram. Repeat in 1 year.    Thanks,  GIN Gaytan CNP

## 2018-10-03 ENCOUNTER — TELEPHONE (OUTPATIENT)
Dept: FAMILY MEDICINE | Facility: CLINIC | Age: 58
End: 2018-10-03

## 2018-10-03 NOTE — TELEPHONE ENCOUNTER
Panel Management Review      Patient has the following on her problem list:     Depression / Dysthymia review    Measure:  Needs PHQ-9 score of 4 or less during index window.  Administer PHQ-9 and if score is 5 or more, send encounter to provider for next steps.    5 - 7 month window range: DUE    PHQ-9 SCORE 8/12/2016 3/15/2017 3/8/2018   Total Score - - -   Total Score 2 0 0       If PHQ-9 recheck is 5 or more, route to provider for next steps.    Patient is due for:  PHQ9 and DAP      Composite cancer screening  Chart review shows that this patient is due/due soon for the following Pap Smear  Summary:    Patient is due/failing the following:   PAP and PHQ9    Action needed:   Patient needs office visit for pap . and Patient needs to do PHQ9.    Type of outreach:    Phone, left message for patient to call back.     Questions for provider review:    None                                                                                                                                    Mariangel Gautam MA       Chart routed to Care Team .

## 2019-02-04 ENCOUNTER — TELEPHONE (OUTPATIENT)
Dept: FAMILY MEDICINE | Facility: CLINIC | Age: 59
End: 2019-02-04

## 2019-02-04 NOTE — TELEPHONE ENCOUNTER
Panel Management Review      Patient has the following on her problem list:     Depression / Dysthymia review    Measure:  Needs PHQ-9 score of 4 or less during index window.  Administer PHQ-9 and if score is 5 or more, send encounter to provider for next steps.    5 - 7 month window range: Due    PHQ-9 SCORE 8/12/2016 3/15/2017 3/8/2018   PHQ-9 Total Score - - -   PHQ-9 Total Score 2 0 0       If PHQ-9 recheck is 5 or more, route to provider for next steps.    Patient is due for:  PHQ9      Composite cancer screening  Chart review shows that this patient is due/due soon for the following colon/fit  Summary:    Patient is due/failing the following:   COLONOSCOPY, FIT and PHQ9    Action needed:   Patient needs to do PHQ9. and Patient needs referral/order: colon/fit    Type of outreach:    Phone, left message for patient to call back.     Questions for provider review:    None                                                                                                                                    Mariangel Gautam MA       Chart routed to Care Team .

## 2019-03-25 ENCOUNTER — TELEPHONE (OUTPATIENT)
Dept: FAMILY MEDICINE | Facility: CLINIC | Age: 59
End: 2019-03-25

## 2019-03-25 DIAGNOSIS — G43.009 MIGRAINE WITHOUT AURA AND WITHOUT STATUS MIGRAINOSUS, NOT INTRACTABLE: ICD-10-CM

## 2019-03-25 RX ORDER — RIZATRIPTAN BENZOATE 10 MG/1
10 TABLET ORAL SEE ADMIN INSTRUCTIONS
Qty: 14 TABLET | Refills: 0 | Status: SHIPPED | OUTPATIENT
Start: 2019-03-25

## 2019-03-25 NOTE — TELEPHONE ENCOUNTER
Maxalt      Last Written Prescription Date:  3/8/18  Last Fill Quantity: 30,   # refills: 1  Last Office Visit: 3/8/18  Future Office visit:    Next 5 appointments (look out 90 days)    Apr 02, 2019  8:20 AM CDT  SHORT with GIN [facility] [address] [address]  [phone]           Routing refill request to provider for review/approval because:

## 2019-03-25 NOTE — TELEPHONE ENCOUNTER
Prescription approved per [facility] Refill Protocol.    Nella CARBALLO RN

## 2019-04-11 DIAGNOSIS — F41.9 ANXIETY: ICD-10-CM

## 2019-04-11 DIAGNOSIS — F34.1 DYSTHYMIC DISORDER: ICD-10-CM

## 2019-04-11 RX ORDER — VENLAFAXINE HYDROCHLORIDE 150 MG/1
CAPSULE, EXTENDED RELEASE ORAL
Qty: 30 CAPSULE | Refills: 0 | Status: SHIPPED | OUTPATIENT
Start: 2019-04-11

## 2019-04-11 RX ORDER — METOPROLOL SUCCINATE 25 MG/1
TABLET, EXTENDED RELEASE ORAL
Qty: 30 TABLET | Refills: 0 | Status: SHIPPED | OUTPATIENT
Start: 2019-04-11

## 2019-04-11 NOTE — LETTER
April 11, 2019      Bailee Britt  [address] [place] [id number]        Dear Bailee,     Your medication is being filled for 1 month refill only due to:  Patient needs to be seen because it has been more than one year since last visit.    Please call [phone] to arrange your appointment with a care provider.    Thank you.    Sincerely,        Rachel Schaffer MD/ Soledad Klein RN        lar

## 2019-04-11 NOTE — TELEPHONE ENCOUNTER
"Venlafaxine, 150 mg  Last Written Prescription Date:  3/8/18  Last Fill Quantity: 90,  # refills: 3   Last office visit: 3/8/2018 with prescribing provider:    JAMSHID Gomez  Future Office Visit:       metoprolol, 25 mg  Last Written Prescription Date:  3/8/18  Last Fill Quantity: 90,  # refills: 3   Last office visit: 3/8/2018 with prescribing provider:  Blanco   Future Office Visit:      Requested Prescriptions   Pending Prescriptions Disp Refills     metoprolol succinate ER (TOPROL-XL) 25 MG 24 hr tablet [Pharmacy Med Name: METOPROLOL ER 25MG  TAB] 30 tablet 11     Sig: TAKE 1 TABLET BY MOUTH ONCE DAILY       Beta-Blockers Protocol Failed - 4/11/2019  5:16 PM        Failed - Blood pressure under 140/90 in past 12 months     BP Readings from Last 3 Encounters:   03/08/18 119/79   03/15/17 116/67   08/12/16 120/78                 Failed - Recent (12 mo) or future (30 days) visit within the authorizing provider's specialty     Patient had office visit in the last 12 months or has a visit in the next 30 days with authorizing provider or within the authorizing provider's specialty.  See \"Patient Info\" tab in inbasket, or \"Choose Columns\" in Meds & Orders section of the refill encounter.              Passed - Patient is age 6 or older        Passed - Medication is active on med list        venlafaxine (EFFEXOR-XR) 150 MG 24 hr capsule [Pharmacy Med Name: VENLAFAXINE ER 150MG CAP] 30 capsule 11     Sig: TAKE 1 CAPSULE BY MOUTH ONCE DAILY       Serotonin-Norepinephrine Reuptake Inhibitors  Failed - 4/11/2019  5:16 PM        Failed - Blood pressure under 140/90 in past 12 months     BP Readings from Last 3 Encounters:   03/08/18 119/79   03/15/17 116/67   08/12/16 120/78                 Failed - PHQ-9 score of less than 5 in past 6 months     Please review last PHQ-9 score.           Failed - Normal serum creatinine on file in past 12 months     Recent Labs   Lab Test 09/15/17  1338   CR 0.71             Failed - Recent (6 " "mo) or future (30 days) visit within the authorizing provider's specialty     Patient had office visit in the last 6 months or has a visit in the next 30 days with authorizing provider or within the authorizing provider's specialty.  See \"Patient Info\" tab in inbasket, or \"Choose Columns\" in Meds & Orders section of the refill encounter.            Passed - Medication is active on med list        Passed - Patient is age 18 or older        Passed - No active pregnancy on record        Passed - No positive pregnancy test in past 12 months          "

## 2019-04-11 NOTE — TELEPHONE ENCOUNTER
Medication is being filled for 1 time refill only due to:  Patient needs to be seen because it has been more than one year since last visit.     Reminder letter sent to ever Klein RN

## 2019-04-24 ENCOUNTER — RECORDS - HEALTHEAST (OUTPATIENT)
Dept: LAB | Facility: CLINIC | Age: 59
End: 2019-04-24

## 2019-04-24 LAB
ANION GAP SERPL CALCULATED.3IONS-SCNC: 10 MMOL/L (ref 5–18)
BUN SERPL-MCNC: 10 MG/DL (ref 8–22)
CALCIUM SERPL-MCNC: 10 MG/DL (ref 8.5–10.5)
CHLORIDE BLD-SCNC: 105 MMOL/L (ref 98–107)
CHOLEST SERPL-MCNC: 202 MG/DL
CO2 SERPL-SCNC: 27 MMOL/L (ref 22–31)
CREAT SERPL-MCNC: 0.76 MG/DL (ref 0.6–1.1)
FASTING STATUS PATIENT QL REPORTED: ABNORMAL
GFR SERPL CREATININE-BSD FRML MDRD: >60 ML/MIN/1.73M2
GLUCOSE BLD-MCNC: 120 MG/DL (ref 70–125)
HDLC SERPL-MCNC: 72 MG/DL
LDLC SERPL CALC-MCNC: 88 MG/DL
POTASSIUM BLD-SCNC: 4.2 MMOL/L (ref 3.5–5)
SODIUM SERPL-SCNC: 142 MMOL/L (ref 136–145)
TRIGL SERPL-MCNC: 212 MG/DL

## 2019-05-23 ENCOUNTER — RECORDS - HEALTHEAST (OUTPATIENT)
Dept: LAB | Facility: CLINIC | Age: 59
End: 2019-05-23

## 2019-05-24 LAB — HIV 1+2 AB+HIV1 P24 AG SERPL QL IA: NEGATIVE

## 2019-05-25 LAB — T PALLIDUM AB SER QL: NEGATIVE

## 2019-05-27 LAB — HCV AB SERPL QL IA: NEGATIVE

## 2019-05-28 LAB
C TRACH DNA SPEC QL PROBE+SIG AMP: NEGATIVE
N GONORRHOEA DNA SPEC QL NAA+PROBE: NEGATIVE

## 2019-09-03 ENCOUNTER — RECORDS - HEALTHEAST (OUTPATIENT)
Dept: LAB | Facility: CLINIC | Age: 59
End: 2019-09-03

## 2019-09-04 LAB — BACTERIA SPEC CULT: NO GROWTH

## 2020-02-10 ENCOUNTER — HEALTH MAINTENANCE LETTER (OUTPATIENT)
Age: 60
End: 2020-02-10

## 2020-11-16 ENCOUNTER — HEALTH MAINTENANCE LETTER (OUTPATIENT)
Age: 60
End: 2020-11-16

## 2021-04-03 ENCOUNTER — HEALTH MAINTENANCE LETTER (OUTPATIENT)
Age: 61
End: 2021-04-03

## 2021-09-18 ENCOUNTER — HEALTH MAINTENANCE LETTER (OUTPATIENT)
Age: 61
End: 2021-09-18

## 2022-04-24 ENCOUNTER — HEALTH MAINTENANCE LETTER (OUTPATIENT)
Age: 62
End: 2022-04-24

## 2022-07-20 ENCOUNTER — TRANSFERRED RECORDS (OUTPATIENT)
Dept: BEHAVIORAL HEALTH | Facility: CLINIC | Age: 62
End: 2022-07-20

## 2022-11-19 ENCOUNTER — HEALTH MAINTENANCE LETTER (OUTPATIENT)
Age: 62
End: 2022-11-19

## 2023-06-01 ENCOUNTER — HEALTH MAINTENANCE LETTER (OUTPATIENT)
Age: 63
End: 2023-06-01